# Patient Record
Sex: FEMALE | Race: WHITE | NOT HISPANIC OR LATINO
[De-identification: names, ages, dates, MRNs, and addresses within clinical notes are randomized per-mention and may not be internally consistent; named-entity substitution may affect disease eponyms.]

---

## 2017-05-01 ENCOUNTER — RX ONLY (OUTPATIENT)
Age: 62
Setting detail: RX ONLY
End: 2017-05-01

## 2017-06-15 ENCOUNTER — HOSPITAL ENCOUNTER (OUTPATIENT)
Dept: LAB | Facility: MEDICAL CENTER | Age: 62
End: 2017-06-15
Attending: FAMILY MEDICINE
Payer: COMMERCIAL

## 2017-06-15 LAB
ALBUMIN SERPL BCP-MCNC: 4 G/DL (ref 3.2–4.9)
ALBUMIN/GLOB SERPL: 1.2 G/DL
ALP SERPL-CCNC: 85 U/L (ref 30–99)
ALT SERPL-CCNC: 22 U/L (ref 2–50)
ANION GAP SERPL CALC-SCNC: 7 MMOL/L (ref 0–11.9)
AST SERPL-CCNC: 16 U/L (ref 12–45)
BASOPHILS # BLD AUTO: 0.6 % (ref 0–1.8)
BASOPHILS # BLD: 0.05 K/UL (ref 0–0.12)
BILIRUB SERPL-MCNC: 0.4 MG/DL (ref 0.1–1.5)
BUN SERPL-MCNC: 17 MG/DL (ref 8–22)
CALCIUM SERPL-MCNC: 9.9 MG/DL (ref 8.5–10.5)
CHLORIDE SERPL-SCNC: 101 MMOL/L (ref 96–112)
CHOLEST SERPL-MCNC: 157 MG/DL (ref 100–199)
CO2 SERPL-SCNC: 31 MMOL/L (ref 20–33)
CREAT SERPL-MCNC: 0.66 MG/DL (ref 0.5–1.4)
EOSINOPHIL # BLD AUTO: 0.22 K/UL (ref 0–0.51)
EOSINOPHIL NFR BLD: 2.4 % (ref 0–6.9)
ERYTHROCYTE [DISTWIDTH] IN BLOOD BY AUTOMATED COUNT: 46.3 FL (ref 35.9–50)
GFR SERPL CREATININE-BSD FRML MDRD: >60 ML/MIN/1.73 M 2
GLOBULIN SER CALC-MCNC: 3.3 G/DL (ref 1.9–3.5)
GLUCOSE SERPL-MCNC: 102 MG/DL (ref 65–99)
HCT VFR BLD AUTO: 45.4 % (ref 37–47)
HDLC SERPL-MCNC: 58 MG/DL
HGB BLD-MCNC: 14.5 G/DL (ref 12–16)
IMM GRANULOCYTES # BLD AUTO: 0.03 K/UL (ref 0–0.11)
IMM GRANULOCYTES NFR BLD AUTO: 0.3 % (ref 0–0.9)
LDLC SERPL CALC-MCNC: 80 MG/DL
LYMPHOCYTES # BLD AUTO: 3.2 K/UL (ref 1–4.8)
LYMPHOCYTES NFR BLD: 35.4 % (ref 22–41)
MCH RBC QN AUTO: 29.8 PG (ref 27–33)
MCHC RBC AUTO-ENTMCNC: 31.9 G/DL (ref 33.6–35)
MCV RBC AUTO: 93.2 FL (ref 81.4–97.8)
MONOCYTES # BLD AUTO: 0.77 K/UL (ref 0–0.85)
MONOCYTES NFR BLD AUTO: 8.5 % (ref 0–13.4)
NEUTROPHILS # BLD AUTO: 4.78 K/UL (ref 2–7.15)
NEUTROPHILS NFR BLD: 52.8 % (ref 44–72)
NRBC # BLD AUTO: 0 K/UL
NRBC BLD AUTO-RTO: 0 /100 WBC
PLATELET # BLD AUTO: 477 K/UL (ref 164–446)
PMV BLD AUTO: 9.8 FL (ref 9–12.9)
POTASSIUM SERPL-SCNC: 4.1 MMOL/L (ref 3.6–5.5)
PROT SERPL-MCNC: 7.3 G/DL (ref 6–8.2)
RBC # BLD AUTO: 4.87 M/UL (ref 4.2–5.4)
SODIUM SERPL-SCNC: 139 MMOL/L (ref 135–145)
T4 FREE SERPL-MCNC: 0.76 NG/DL (ref 0.53–1.43)
TRIGL SERPL-MCNC: 94 MG/DL (ref 0–149)
TSH SERPL DL<=0.005 MIU/L-ACNC: 1.62 UIU/ML (ref 0.3–3.7)
WBC # BLD AUTO: 9.1 K/UL (ref 4.8–10.8)

## 2017-06-15 PROCEDURE — 85025 COMPLETE CBC W/AUTO DIFF WBC: CPT

## 2017-06-15 PROCEDURE — 80061 LIPID PANEL: CPT

## 2017-06-15 PROCEDURE — 36415 COLL VENOUS BLD VENIPUNCTURE: CPT

## 2017-06-15 PROCEDURE — 84439 ASSAY OF FREE THYROXINE: CPT

## 2017-06-15 PROCEDURE — 80053 COMPREHEN METABOLIC PANEL: CPT

## 2017-06-15 PROCEDURE — 84443 ASSAY THYROID STIM HORMONE: CPT

## 2017-10-10 ENCOUNTER — OFFICE VISIT (OUTPATIENT)
Dept: MEDICAL GROUP | Facility: PHYSICIAN GROUP | Age: 62
End: 2017-10-10
Payer: COMMERCIAL

## 2017-10-10 VITALS
TEMPERATURE: 96.6 F | RESPIRATION RATE: 18 BRPM | HEART RATE: 74 BPM | BODY MASS INDEX: 45.41 KG/M2 | OXYGEN SATURATION: 92 % | HEIGHT: 64 IN | SYSTOLIC BLOOD PRESSURE: 132 MMHG | WEIGHT: 266 LBS | DIASTOLIC BLOOD PRESSURE: 84 MMHG

## 2017-10-10 DIAGNOSIS — L30.9 DERMATITIS: ICD-10-CM

## 2017-10-10 DIAGNOSIS — Z12.39 SCREENING FOR BREAST CANCER: ICD-10-CM

## 2017-10-10 DIAGNOSIS — E66.01 MORBID OBESITY WITH BMI OF 45.0-49.9, ADULT (HCC): ICD-10-CM

## 2017-10-10 DIAGNOSIS — J45.40 MODERATE PERSISTENT ASTHMA WITHOUT COMPLICATION: ICD-10-CM

## 2017-10-10 DIAGNOSIS — J30.89 CHRONIC NONSEASONAL ALLERGIC RHINITIS DUE TO POLLEN: ICD-10-CM

## 2017-10-10 PROCEDURE — 99214 OFFICE O/P EST MOD 30 MIN: CPT | Performed by: FAMILY MEDICINE

## 2017-10-10 RX ORDER — FLUTICASONE PROPIONATE 110 UG/1
2 AEROSOL, METERED RESPIRATORY (INHALATION) 2 TIMES DAILY
Qty: 1 INHALER | Refills: 3 | Status: SHIPPED | OUTPATIENT
Start: 2017-10-10 | End: 2018-03-27

## 2017-10-10 RX ORDER — LOSARTAN POTASSIUM 100 MG/1
100 TABLET ORAL DAILY
COMMUNITY

## 2017-10-10 RX ORDER — ALBUTEROL SULFATE 90 UG/1
2 AEROSOL, METERED RESPIRATORY (INHALATION) EVERY 6 HOURS PRN
Qty: 8.5 G | Refills: 0 | Status: SHIPPED | OUTPATIENT
Start: 2017-10-10

## 2017-10-10 RX ORDER — TRIAMTERENE AND HYDROCHLOROTHIAZIDE 37.5; 25 MG/1; MG/1
1 CAPSULE ORAL EVERY MORNING
COMMUNITY

## 2017-10-10 RX ORDER — TRIAMCINOLONE ACETONIDE 40 MG/ML
40 INJECTION, SUSPENSION INTRA-ARTICULAR; INTRAMUSCULAR ONCE
Status: COMPLETED | OUTPATIENT
Start: 2017-10-10 | End: 2017-10-10

## 2017-10-10 RX ADMIN — TRIAMCINOLONE ACETONIDE 40 MG: 40 INJECTION, SUSPENSION INTRA-ARTICULAR; INTRAMUSCULAR at 08:53

## 2017-10-10 NOTE — PROGRESS NOTES
Chief Complaint   Patient presents with   • Rash     on legs   • Allergic Reaction       HISTORY OF PRESENT ILLNESS: Patient is a 62 y.o. female established patient here today for the following concerns:    1. Chronic nonseasonal allergic rhinitis due to pollen  Here today with worsening allergies, reports sinus drainage, congestion, rhinorrhea, decreased hearing with congestion.  - Triamcinolone Acetonide    2. Dermatitis  Reports rash on both legs associated with swelling and itching.  No new exposures that she can think of.  Initially cortisone was helping.  Now not helping much.    - Triamcinolone Acetonide    3. Moderate persistent asthma without complication  Here today for renewal on flovent and albuterol.  Reports that she has not had to use rescue much at all recently.     4. Morbid obesity with BMI of 45.0-49.9, adult (CMS-[facility])  Counseled on weight today.  Has tried Paleo diet with some success.     5. Screening for breast cancer  Due for mammo- declines but is willing to do screening breast ultrasound.     Past Medical, Social, and Family history reviewed and updated in EPIC    Allergies:Review of patient's allergies indicates no known allergies.    Current Outpatient Prescriptions   Medication Sig Dispense Refill   • losartan (COZAAR) 100 MG Tab Take 100 mg by mouth every day.     • triamterene/hctz (MAXZIDE-25/DYAZIDE) 37.5-25 MG Cap Take 1 Cap by mouth every morning.     • albuterol 108 (90 Base) MCG/ACT Aero Soln inhalation aerosol Inhale 2 Puffs by mouth every 6 hours as needed for Shortness of Breath. 8.5 g 0   • fluticasone (FLOVENT HFA) 110 MCG/ACT Aerosol Inhale 2 Puffs by mouth 2 times a day. 1 Inhaler 3   • aspirin (ASA) 81 MG Chew Tab chewable tablet Take 81 mg by mouth every day.     • Non Formulary Request Take 450 mg by mouth every day. Indications: Tumeric     • methocarbamol (ROBAXIN) 500 MG TABS Take 1,000 mg by mouth 3 times a day.     • Calcium Carbonate-Vitamin D (CALTRATE 600+D PO)  Take  by mouth every day.     • Milk Thistle 175 MG CAPS Take 175 mg by mouth every day.     • Probiotic Product (PROBIOTIC PO) Take  by mouth every day.     • Oxycodone-Acetaminophen (PERCOCET-10)  MG TABS Take 1-2 Tabs by mouth every four hours as needed.     • morphine (MS IR) 15 MG tablet Take 15 mg by mouth every day at 6 PM.     • polyethylene glycol/lytes (MIRALAX) PACK Take 17 g by mouth every day.     • metoprolol SR (TOPROL XL) 50 MG TB24 TAKE ONE TABLET BY MOUTH EVERY DAY 30 Tab 6   • acetaminophen/caffeine/butalbital 325-40-50 mg (FIORICET) -40 MG TABS Take 1 Tab by mouth every four hours as needed.     • Red Yeast Rice Extract (RED YEAST RICE PO) Take 1,200 mg by mouth.     • topiramate (TOPAMAX) 25 MG TABS Take 25 mg by mouth every day. Takes 1.5 at HS     • FISH OIL        Current Facility-Administered Medications   Medication Dose Route Frequency Provider Last Rate Last Dose   • triamcinolone acetonide (KENALOG-40) injection 40 mg  40 mg Intramuscular Once Magalie MICHELLE Castaneda M.D.             ROS:  Review of Systems   Constitutional: Negative for fever, chills, weight loss and malaise/fatigue.   HENT: Negative for ear pain, nosebleeds, congestion, sore throat and neck pain.    Eyes: Negative for blurred vision.   Respiratory: Negative for cough, sputum production, shortness of breath and wheezing.    Cardiovascular: Negative for chest pain, palpitations,  and leg swelling.   Gastrointestinal: Negative for heartburn, nausea, vomiting, diarrhea and abdominal pain.   Genitourinary: Negative for dysuria, urgency and frequency.   Musculoskeletal: Negative for myalgias, back pain and joint pain.   Skin: Negative for rash and itching.   Neurological: Negative for dizziness, tingling, tremors, sensory change, focal weakness and headaches.   Endo/Heme/Allergies: Does not bruise/bleed easily.   Psychiatric/Behavioral: Negative for depression, anxiety, suicidal ideas, insomnia and memory loss.   "    Exam:  Blood pressure 132/84, pulse 74, temperature 35.9 °C (96.6 °F), resp. rate 18, height 1.626 m (5' 4\"), weight 120.7 kg (266 lb), SpO2 92 %.    General:  Well nourished, well developed in NAD  Head is grossly normal.  Neck: Supple without JVD   Pulmonary:  Normal effort.   Cardiovascular: Regular rate  Extremities: no clubbing, cyanosis, or trace non-pitting edema.  Psych: affect appropriate      Please note that this dictation was created using voice recognition software. I have made every reasonable attempt to correct obvious errors, but I expect that there are errors of grammar and possibly content that I did not discover before finalizing the note.    Assessment/Plan:  1. Chronic nonseasonal allergic rhinitis due to pollen  - triamcinolone acetonide (KENALOG-40) injection 40 mg; 1 mL by Intramuscular route Once.    2. Dermatitis  - triamcinolone acetonide (KENALOG-40) injection 40 mg; 1 mL by Intramuscular route Once.    3. Moderate persistent asthma without complication  - albuterol 108 (90 Base) MCG/ACT Aero Soln inhalation aerosol; Inhale 2 Puffs by mouth every 6 hours as needed for Shortness of Breath.  Dispense: 8.5 g; Refill: 0  - fluticasone (FLOVENT HFA) 110 MCG/ACT Aerosol; Inhale 2 Puffs by mouth 2 times a day.  Dispense: 1 Inhaler; Refill: 3    4. Morbid obesity with BMI of 45.0-49.9, adult (CMS-[facility])  - Patient identified as having weight management issue.  Appropriate orders and counseling given.    5. Screening for breast cancer  - US-SCREENING BREAST (SONOCINE); Future    3 month follow up        "

## 2017-10-21 ENCOUNTER — HOSPITAL ENCOUNTER (OUTPATIENT)
Dept: RADIOLOGY | Facility: MEDICAL CENTER | Age: 62
End: 2017-10-21
Attending: FAMILY MEDICINE
Payer: COMMERCIAL

## 2017-10-21 DIAGNOSIS — R92.30 DENSE BREAST TISSUE: ICD-10-CM

## 2017-10-21 PROCEDURE — 76641 ULTRASOUND BREAST COMPLETE: CPT

## 2017-10-24 ENCOUNTER — TELEPHONE (OUTPATIENT)
Dept: MEDICAL GROUP | Facility: PHYSICIAN GROUP | Age: 62
End: 2017-10-24

## 2017-10-24 NOTE — TELEPHONE ENCOUNTER
Pt states she cant do mammograms because they are painful and that is why she prefers the sonocine .. She also stated her Flovent was too expensive and wanted to know if you can prescribe her something else

## 2017-10-24 NOTE — TELEPHONE ENCOUNTER
----- Message from Magalie Castaneda M.D. sent at 10/24/2017 11:33 AM PDT -----  Breast ultrasound was normal.  They recommend that since breasts are not dense that mammogram is actually more sensitive for early cancers and should be done next year.

## 2018-03-09 ENCOUNTER — OFFICE VISIT (OUTPATIENT)
Dept: URGENT CARE | Facility: PHYSICIAN GROUP | Age: 63
End: 2018-03-09
Payer: COMMERCIAL

## 2018-03-09 VITALS
DIASTOLIC BLOOD PRESSURE: 76 MMHG | WEIGHT: 276 LBS | SYSTOLIC BLOOD PRESSURE: 118 MMHG | BODY MASS INDEX: 47.12 KG/M2 | OXYGEN SATURATION: 93 % | HEART RATE: 73 BPM | RESPIRATION RATE: 20 BRPM | TEMPERATURE: 98.6 F | HEIGHT: 64 IN

## 2018-03-09 DIAGNOSIS — J01.41 ACUTE RECURRENT PANSINUSITIS: Primary | ICD-10-CM

## 2018-03-09 DIAGNOSIS — L30.9 DERMATITIS: ICD-10-CM

## 2018-03-09 PROCEDURE — 99214 OFFICE O/P EST MOD 30 MIN: CPT | Performed by: PHYSICIAN ASSISTANT

## 2018-03-09 RX ORDER — TRIAMCINOLONE ACETONIDE 40 MG/ML
40 INJECTION, SUSPENSION INTRA-ARTICULAR; INTRAMUSCULAR ONCE
Status: COMPLETED | OUTPATIENT
Start: 2018-03-09 | End: 2018-03-09

## 2018-03-09 RX ORDER — CEFDINIR 300 MG/1
300 CAPSULE ORAL 2 TIMES DAILY
Qty: 20 CAP | Refills: 0 | Status: SHIPPED | OUTPATIENT
Start: 2018-03-09 | End: 2018-03-19

## 2018-03-09 RX ADMIN — TRIAMCINOLONE ACETONIDE 40 MG: 40 INJECTION, SUSPENSION INTRA-ARTICULAR; INTRAMUSCULAR at 17:46

## 2018-03-12 ASSESSMENT — ENCOUNTER SYMPTOMS
SINUS PRESSURE: 1
COUGH: 1
HEADACHES: 1
SINUS PAIN: 1
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
## 2018-03-27 ENCOUNTER — OFFICE VISIT (OUTPATIENT)
Dept: MEDICAL GROUP | Facility: PHYSICIAN GROUP | Age: 63
End: 2018-03-27
Payer: COMMERCIAL

## 2018-03-27 VITALS
BODY MASS INDEX: 46.37 KG/M2 | TEMPERATURE: 97.8 F | HEIGHT: 64 IN | DIASTOLIC BLOOD PRESSURE: 82 MMHG | WEIGHT: 271.6 LBS | OXYGEN SATURATION: 97 % | HEART RATE: 67 BPM | SYSTOLIC BLOOD PRESSURE: 132 MMHG | RESPIRATION RATE: 16 BRPM

## 2018-03-27 DIAGNOSIS — R21 RASH: ICD-10-CM

## 2018-03-27 DIAGNOSIS — E66.01 MORBID OBESITY WITH BMI OF 40.0-44.9, ADULT (HCC): ICD-10-CM

## 2018-03-27 DIAGNOSIS — R09.81 CHRONIC NASAL CONGESTION: ICD-10-CM

## 2018-03-27 PROCEDURE — 99213 OFFICE O/P EST LOW 20 MIN: CPT | Performed by: INTERNAL MEDICINE

## 2018-03-27 RX ORDER — PRENATAL VIT 91/IRON/FOLIC/DHA 28-975-200
COMBINATION PACKAGE (EA) ORAL 2 TIMES DAILY
COMMUNITY

## 2018-03-27 RX ORDER — MONTELUKAST SODIUM 10 MG/1
10 TABLET ORAL DAILY
Qty: 60 TAB | Refills: 1 | Status: SHIPPED | OUTPATIENT
Start: 2018-03-27 | End: 2018-07-24 | Stop reason: SDUPTHER

## 2018-03-27 RX ORDER — BENZOCAINE/MENTHOL 6 MG-10 MG
LOZENGE MUCOUS MEMBRANE
Qty: 1 TUBE | Refills: 0 | Status: SHIPPED | OUTPATIENT
Start: 2018-03-27

## 2018-03-27 ASSESSMENT — PATIENT HEALTH QUESTIONNAIRE - PHQ9: CLINICAL INTERPRETATION OF PHQ2 SCORE: 0
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
## 2018-03-27 NOTE — ASSESSMENT & PLAN NOTE
Has had a rash of her right lower extremity since fall 2017 that at first did better with kenalog but not after her most recent kenalog injection 3/9/18.

## 2018-03-27 NOTE — ASSESSMENT & PLAN NOTE
Waking up in the mornings and has green nasal discharge, cough and low on energy also with headaches. She was seen in urgent care 3/9/18 and was given kenalog for these symptoms and a rash on her right leg. Since then she's had no improvement in her symptoms. Denies fevers, chills. Her nasal congestion symptoms improve as the day goes on. She starts to have wheezing later on as the day goes on. She has seen Dr. Talamantes in ENT before who says he wouldn't recommend more than 3 kenalog injections per year. Taking mucinex, xyzal, inhalers. The inhalers help her since sometimes she has difficulty with breathing.

## 2018-05-03 ENCOUNTER — APPOINTMENT (RX ONLY)
Dept: URBAN - METROPOLITAN AREA CLINIC 4 | Facility: CLINIC | Age: 63
Setting detail: DERMATOLOGY
End: 2018-05-03

## 2018-05-03 DIAGNOSIS — L82.1 OTHER SEBORRHEIC KERATOSIS: ICD-10-CM

## 2018-05-03 DIAGNOSIS — D18.0 HEMANGIOMA: ICD-10-CM

## 2018-05-03 DIAGNOSIS — L81.4 OTHER MELANIN HYPERPIGMENTATION: ICD-10-CM

## 2018-05-03 DIAGNOSIS — R21 RASH AND OTHER NONSPECIFIC SKIN ERUPTION: ICD-10-CM

## 2018-05-03 DIAGNOSIS — L57.0 ACTINIC KERATOSIS: ICD-10-CM

## 2018-05-03 DIAGNOSIS — D22 MELANOCYTIC NEVI: ICD-10-CM

## 2018-05-03 PROBLEM — J44.9 CHRONIC OBSTRUCTIVE PULMONARY DISEASE, UNSPECIFIED: Status: ACTIVE | Noted: 2018-05-03

## 2018-05-03 PROBLEM — D18.01 HEMANGIOMA OF SKIN AND SUBCUTANEOUS TISSUE: Status: ACTIVE | Noted: 2018-05-03

## 2018-05-03 PROBLEM — I10 ESSENTIAL (PRIMARY) HYPERTENSION: Status: ACTIVE | Noted: 2018-05-03

## 2018-05-03 PROBLEM — D22.62 MELANOCYTIC NEVI OF LEFT UPPER LIMB, INCLUDING SHOULDER: Status: ACTIVE | Noted: 2018-05-03

## 2018-05-03 PROBLEM — D22.72 MELANOCYTIC NEVI OF LEFT LOWER LIMB, INCLUDING HIP: Status: ACTIVE | Noted: 2018-05-03

## 2018-05-03 PROBLEM — D22.71 MELANOCYTIC NEVI OF RIGHT LOWER LIMB, INCLUDING HIP: Status: ACTIVE | Noted: 2018-05-03

## 2018-05-03 PROBLEM — Z85.828 PERSONAL HISTORY OF OTHER MALIGNANT NEOPLASM OF SKIN: Status: ACTIVE | Noted: 2018-05-03

## 2018-05-03 PROBLEM — D22.61 MELANOCYTIC NEVI OF RIGHT UPPER LIMB, INCLUDING SHOULDER: Status: ACTIVE | Noted: 2018-05-03

## 2018-05-03 PROBLEM — D22.5 MELANOCYTIC NEVI OF TRUNK: Status: ACTIVE | Noted: 2018-05-03

## 2018-05-03 PROCEDURE — 17000 DESTRUCT PREMALG LESION: CPT

## 2018-05-03 PROCEDURE — ? LIQUID NITROGEN

## 2018-05-03 PROCEDURE — ? COUNSELING

## 2018-05-03 PROCEDURE — 17003 DESTRUCT PREMALG LES 2-14: CPT

## 2018-05-03 PROCEDURE — ? SUNSCREEN RECOMMENDATIONS

## 2018-05-03 PROCEDURE — ? PRESCRIPTION

## 2018-05-03 PROCEDURE — 99213 OFFICE O/P EST LOW 20 MIN: CPT | Mod: 25

## 2018-05-03 RX ORDER — TRIAMCINOLONE ACETONIDE 1 MG/G
CREAM TOPICAL
Qty: 1 | Refills: 3 | Status: ERX | COMMUNITY
Start: 2018-05-03

## 2018-05-03 RX ADMIN — TRIAMCINOLONE ACETONIDE: 1 CREAM TOPICAL at 00:00

## 2018-05-03 ASSESSMENT — LOCATION ZONE DERM
LOCATION ZONE: FACE
LOCATION ZONE: HAND
LOCATION ZONE: TRUNK
LOCATION ZONE: NECK
LOCATION ZONE: LEG
LOCATION ZONE: ARM

## 2018-05-03 ASSESSMENT — LOCATION SIMPLE DESCRIPTION DERM
LOCATION SIMPLE: LEFT FOREARM
LOCATION SIMPLE: LEFT CHEEK
LOCATION SIMPLE: RIGHT POSTERIOR UPPER ARM
LOCATION SIMPLE: LEFT POSTERIOR UPPER ARM
LOCATION SIMPLE: RIGHT CHEEK
LOCATION SIMPLE: LEFT UPPER BACK
LOCATION SIMPLE: RIGHT PRETIBIAL REGION
LOCATION SIMPLE: UPPER BACK
LOCATION SIMPLE: RIGHT HAND
LOCATION SIMPLE: LEFT HAND
LOCATION SIMPLE: RIGHT THIGH
LOCATION SIMPLE: LEFT PRETIBIAL REGION
LOCATION SIMPLE: RIGHT LOWER BACK
LOCATION SIMPLE: LEFT THIGH
LOCATION SIMPLE: LEFT ANTERIOR NECK
LOCATION SIMPLE: RIGHT FOREARM
LOCATION SIMPLE: ABDOMEN

## 2018-05-03 ASSESSMENT — LOCATION DETAILED DESCRIPTION DERM
LOCATION DETAILED: RIGHT DISTAL PRETIBIAL REGION
LOCATION DETAILED: LEFT CENTRAL MALAR CHEEK
LOCATION DETAILED: LEFT ULNAR DORSAL HAND
LOCATION DETAILED: LEFT SUPERIOR MEDIAL UPPER BACK
LOCATION DETAILED: LEFT INFERIOR CENTRAL MALAR CHEEK
LOCATION DETAILED: RIGHT SUPERIOR MEDIAL MIDBACK
LOCATION DETAILED: RIGHT ANTERIOR PROXIMAL THIGH
LOCATION DETAILED: LEFT SUPERIOR LATERAL MALAR CHEEK
LOCATION DETAILED: RIGHT RIB CAGE
LOCATION DETAILED: LEFT ANTERIOR PROXIMAL THIGH
LOCATION DETAILED: LEFT DISTAL PRETIBIAL REGION
LOCATION DETAILED: RIGHT DISTAL POSTERIOR UPPER ARM
LOCATION DETAILED: RIGHT CENTRAL MALAR CHEEK
LOCATION DETAILED: RIGHT PROXIMAL DORSAL FOREARM
LOCATION DETAILED: RIGHT RADIAL DORSAL HAND
LOCATION DETAILED: LEFT INFERIOR ANTERIOR NECK
LOCATION DETAILED: LEFT PROXIMAL POSTERIOR UPPER ARM
LOCATION DETAILED: PERIUMBILICAL SKIN
LOCATION DETAILED: SUPERIOR THORACIC SPINE
LOCATION DETAILED: LEFT PROXIMAL DORSAL FOREARM

## 2018-05-03 NOTE — PROCEDURE: LIQUID NITROGEN
Duration Of Freeze Thaw-Cycle (Seconds): 3
Post-Care Instructions: I reviewed with the patient in detail post-care instructions. Patient is to wear sunprotection, and avoid picking at any of the treated lesions. Pt may apply Vaseline to crusted or scabbing areas.
Render Post-Care Instructions In Note?: no
Number Of Freeze-Thaw Cycles: 2 freeze-thaw cycles
Consent: The patient's consent was obtained including but not limited to risks of crusting, scabbing, blistering, scarring, darker or lighter pigmentary change, recurrence, incomplete removal and infection.
Detail Level: Simple

## 2018-05-03 NOTE — HPI: FULL BODY SKIN EXAMINATION
How Severe Are Your Spot(S)?: moderate
What Is The Reason For Today's Visit?: Full Body Skin Examination
What Is The Reason For Today's Visit? (Being Monitored For X): the risk of recurrence of previously treated lesion(s)
Additional History: Intermittent rash on lower right leg for 2 years. FBE.

## 2018-05-21 ENCOUNTER — HOSPITAL ENCOUNTER (OUTPATIENT)
Dept: RADIOLOGY | Facility: MEDICAL CENTER | Age: 63
End: 2018-05-21
Attending: ALLERGY & IMMUNOLOGY
Payer: COMMERCIAL

## 2018-05-21 DIAGNOSIS — R05.9 COUGH: ICD-10-CM

## 2018-05-21 PROCEDURE — 71046 X-RAY EXAM CHEST 2 VIEWS: CPT

## 2018-07-08 ENCOUNTER — HOSPITAL ENCOUNTER (OUTPATIENT)
Dept: RADIOLOGY | Facility: MEDICAL CENTER | Age: 63
End: 2018-07-08
Attending: EMERGENCY MEDICINE
Payer: COMMERCIAL

## 2018-07-08 ENCOUNTER — OFFICE VISIT (OUTPATIENT)
Dept: URGENT CARE | Facility: PHYSICIAN GROUP | Age: 63
End: 2018-07-08
Payer: COMMERCIAL

## 2018-07-08 VITALS
TEMPERATURE: 99 F | OXYGEN SATURATION: 95 % | BODY MASS INDEX: 46.17 KG/M2 | WEIGHT: 269 LBS | HEART RATE: 61 BPM | RESPIRATION RATE: 16 BRPM | SYSTOLIC BLOOD PRESSURE: 128 MMHG | DIASTOLIC BLOOD PRESSURE: 76 MMHG

## 2018-07-08 DIAGNOSIS — M25.561 ACUTE PAIN OF RIGHT KNEE: ICD-10-CM

## 2018-07-08 PROCEDURE — 73562 X-RAY EXAM OF KNEE 3: CPT | Mod: RT

## 2018-07-08 PROCEDURE — 99214 OFFICE O/P EST MOD 30 MIN: CPT | Performed by: EMERGENCY MEDICINE

## 2018-07-08 ASSESSMENT — ENCOUNTER SYMPTOMS
EYE REDNESS: 0
SPEECH CHANGE: 0
SENSORY CHANGE: 0
FEVER: 0
EYE DISCHARGE: 0
VOMITING: 0
FALLS: 0
CHILLS: 0
NAUSEA: 0
NERVOUS/ANXIOUS: 0

## 2018-07-09 ASSESSMENT — ENCOUNTER SYMPTOMS
ABDOMINAL PAIN: 0
COUGH: 0
PALPITATIONS: 0
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
## 2018-07-24 ENCOUNTER — OFFICE VISIT (OUTPATIENT)
Dept: MEDICAL GROUP | Facility: PHYSICIAN GROUP | Age: 63
End: 2018-07-24
Payer: COMMERCIAL

## 2018-07-24 VITALS
RESPIRATION RATE: 16 BRPM | OXYGEN SATURATION: 94 % | DIASTOLIC BLOOD PRESSURE: 78 MMHG | TEMPERATURE: 97.3 F | SYSTOLIC BLOOD PRESSURE: 138 MMHG | WEIGHT: 265 LBS | BODY MASS INDEX: 45.24 KG/M2 | HEART RATE: 85 BPM | HEIGHT: 64 IN

## 2018-07-24 DIAGNOSIS — J45.909 UNCOMPLICATED ASTHMA, UNSPECIFIED ASTHMA SEVERITY, UNSPECIFIED WHETHER PERSISTENT: ICD-10-CM

## 2018-07-24 DIAGNOSIS — R09.81 CHRONIC NASAL CONGESTION: ICD-10-CM

## 2018-07-24 DIAGNOSIS — M25.561 ACUTE PAIN OF RIGHT KNEE: ICD-10-CM

## 2018-07-24 DIAGNOSIS — B37.9 YEAST INFECTION: ICD-10-CM

## 2018-07-24 PROCEDURE — 99213 OFFICE O/P EST LOW 20 MIN: CPT | Performed by: INTERNAL MEDICINE

## 2018-07-24 RX ORDER — FLUTICASONE PROPIONATE 110 UG/1
2 AEROSOL, METERED RESPIRATORY (INHALATION) 2 TIMES DAILY
Qty: 1 INHALER | Refills: 1 | Status: SHIPPED | OUTPATIENT
Start: 2018-07-24

## 2018-07-24 RX ORDER — CLOTRIMAZOLE 1 %
CREAM (GRAM) TOPICAL
Qty: 1 TUBE | Refills: 0 | Status: SHIPPED | OUTPATIENT
Start: 2018-07-24

## 2018-07-24 RX ORDER — MONTELUKAST SODIUM 10 MG/1
10 TABLET ORAL DAILY
Qty: 60 TAB | Refills: 1 | Status: SHIPPED | OUTPATIENT
Start: 2018-07-24 | End: 2018-11-21 | Stop reason: SDUPTHER

## 2018-07-24 RX ORDER — MORPHINE SULFATE 15 MG/1
TABLET, FILM COATED, EXTENDED RELEASE ORAL
COMMUNITY
Start: 2018-07-05

## 2018-07-24 RX ORDER — ALBUTEROL SULFATE 90 UG/1
2 AEROSOL, METERED RESPIRATORY (INHALATION) EVERY 6 HOURS PRN
Qty: 8.5 G | Refills: 1 | Status: SHIPPED | OUTPATIENT
Start: 2018-07-24

## 2018-07-24 RX ORDER — TRIAMCINOLONE ACETONIDE 1 MG/G
CREAM TOPICAL
COMMUNITY
Start: 2018-05-03

## 2018-07-25 NOTE — ASSESSMENT & PLAN NOTE
Felt a pop in her right knee on 7/3/2018 in the late afternoon and she was pushing something across the backseat of her car. The sudden pop hurt her really badly. Symptoms didn't improve and she used a cane to ambulate with and came to urgent care 7/8/2018 with an x-ray showing just arthritis. She was given crutches. Can bear only very little weight on her leg. She has to sit in a shower chair since she can't stand up. Without her brace and getting in and out of the shower she keeps a crutch or a cane close by and feels a lot of instability. Inward movement of her right foot causes her knee pain to be much worse.

## 2018-07-25 NOTE — PROGRESS NOTES
PRIMARY CARE CLINIC FOLLOW UP VISIT  Chief Complaint   Patient presents with   • Knee Pain     Rt Side    • Nasal Congestion     Singulair Refill    • Asthma     Inhaler Refill    • Yeast Infection     Abdomen Area     History of Present Illness     Dorota is a pleasant 64 yo female patient of Dr. Castaneda presenting for the following:     Acute pain of right knee  Felt a pop in her right knee on 7/3/2018 in the late afternoon and she was pushing something across the backseat of her car. The sudden pop hurt her really badly. Symptoms didn't improve and she used a cane to ambulate with and came to urgent care 7/8/2018 with an x-ray showing just arthritis. She was given crutches. Can bear only very little weight on her leg. She has to sit in a shower chair since she can't stand up. Without her brace and getting in and out of the shower she keeps a crutch or a cane close by and feels a lot of instability. Inward movement of her right foot causes her knee pain to be much worse.     Yeast infection  She has been having a sensation of fire under her pannus for a long while and it is malodorous.     Current Outpatient Prescriptions   Medication Sig Dispense Refill   • morphine ER (MS CONTIN) 15 MG Tab CR tablet      • triamcinolone acetonide (KENALOG) 0.1 % Cream      • montelukast (SINGULAIR) 10 MG Tab Take 1 Tab by mouth every day. 60 Tab 1   • fluticasone (FLOVENT HFA) 110 MCG/ACT Aerosol Inhale 2 Puffs by mouth 2 times a day. 1 Inhaler 1   • albuterol 108 (90 Base) MCG/ACT Aero Soln inhalation aerosol Inhale 2 Puffs by mouth every 6 hours as needed for Shortness of Breath. 8.5 g 1   • clotrimazole (LOTRIMIN) 1 % Cream Apply to affected areas daily 1 Tube 0   • terbinafine (LAMISIL) 1 % cream Apply  to affected area(s) 2 times a day.     • hydrocortisone 1 % Cream Apply daily to right leg rash 1 Tube 0   • mometasone (ASMANEX) 220 MCG/INH inhaler Inhale 2 Puffs by mouth every day. 1 Inhaler 5   • losartan (COZAAR) 100 MG  "Tab Take 100 mg by mouth every day.     • triamterene/hctz (MAXZIDE-25/DYAZIDE) 37.5-25 MG Cap Take 1 Cap by mouth every morning.     • albuterol 108 (90 Base) MCG/ACT Aero Soln inhalation aerosol Inhale 2 Puffs by mouth every 6 hours as needed for Shortness of Breath. 8.5 g 0   • aspirin (ASA) 81 MG Chew Tab chewable tablet Take 81 mg by mouth every day.     • Non Formulary Request Take 450 mg by mouth every day. Indications: Tumeric     • methocarbamol (ROBAXIN) 500 MG TABS Take 1,000 mg by mouth 3 times a day.     • Calcium Carbonate-Vitamin D (CALTRATE 600+D PO) Take  by mouth every day.     • Milk Thistle 175 MG CAPS Take 175 mg by mouth every day.     • Probiotic Product (PROBIOTIC PO) Take  by mouth every day.     • Oxycodone-Acetaminophen (PERCOCET-10)  MG TABS Take 1-2 Tabs by mouth every four hours as needed.     • morphine (MS IR) 15 MG tablet Take 15 mg by mouth every day at 6 PM.     • polyethylene glycol/lytes (MIRALAX) PACK Take 17 g by mouth every day.     • metoprolol SR (TOPROL XL) 50 MG TB24 TAKE ONE TABLET BY MOUTH EVERY DAY 30 Tab 6   • acetaminophen/caffeine/butalbital 325-40-50 mg (FIORICET) -40 MG TABS Take 1 Tab by mouth every four hours as needed.     • Red Yeast Rice Extract (RED YEAST RICE PO) Take 1,200 mg by mouth.     • topiramate (TOPAMAX) 25 MG TABS Take 25 mg by mouth every day. Takes 1.5 at HS     • FISH OIL        No current facility-administered medications for this visit.      ROS  As per HPI above. All other systems reviewed and negative.        Objective   Blood pressure 138/78, pulse 85, temperature 36.3 °C (97.3 °F), resp. rate 16, height 1.626 m (5' 4\"), weight 120.2 kg (265 lb), SpO2 94 %. Body mass index is 45.49 kg/m².    General: alert and oriented, pleasant, cooperative  Lymphatics: no cervical or supraclavicular lymphadenopathy   Skin: erythema underneath pannus and into inguinal regions   MSK: medial right knee effusion   Psychiatric: appropriate mood and " affect. Good insight and appropriate judgment     Assessment and Plan   The following treatment plan was discussed     1. Acute pain of right knee  Her mechanism of injury seems consistent with a MCL tear/involvement, will evaluate with MRI and will likely need management with orthopaedics.   - MR-KNEE-W/O RIGHT; Future    2. Chronic nasal congestion  - montelukast (SINGULAIR) 10 MG Tab; Take 1 Tab by mouth every day.  Dispense: 60 Tab; Refill: 1    3. Uncomplicated asthma, unspecified asthma severity, unspecified whether persistent  - fluticasone (FLOVENT HFA) 110 MCG/ACT Aerosol; Inhale 2 Puffs by mouth 2 times a day.  Dispense: 1 Inhaler; Refill: 1  - albuterol 108 (90 Base) MCG/ACT Aero Soln inhalation aerosol; Inhale 2 Puffs by mouth every 6 hours as needed for Shortness of Breath.  Dispense: 8.5 g; Refill: 1    4. Yeast infection  Advised her to keep the area clean and dry and prescribed clotrimazole.   - clotrimazole (LOTRIMIN) 1 % Cream; Apply to affected areas daily  Dispense: 1 Tube; Refill: 0    Return if symptoms worsen or fail to improve.    Dm More MD  Internal Medicine  [facility]

## 2018-07-26 ENCOUNTER — HOSPITAL ENCOUNTER (OUTPATIENT)
Dept: LAB | Facility: MEDICAL CENTER | Age: 63
End: 2018-07-26
Attending: INTERNAL MEDICINE
Payer: COMMERCIAL

## 2018-07-26 LAB
ALBUMIN SERPL BCP-MCNC: 4.3 G/DL (ref 3.2–4.9)
ALBUMIN/GLOB SERPL: 1.4 G/DL
ALP SERPL-CCNC: 78 U/L (ref 30–99)
ALT SERPL-CCNC: 31 U/L (ref 2–50)
ANION GAP SERPL CALC-SCNC: 8 MMOL/L (ref 0–11.9)
AST SERPL-CCNC: 24 U/L (ref 12–45)
BASOPHILS # BLD AUTO: 0.7 % (ref 0–1.8)
BASOPHILS # BLD: 0.05 K/UL (ref 0–0.12)
BILIRUB SERPL-MCNC: 0.4 MG/DL (ref 0.1–1.5)
BUN SERPL-MCNC: 13 MG/DL (ref 8–22)
CALCIUM SERPL-MCNC: 10.1 MG/DL (ref 8.5–10.5)
CHLORIDE SERPL-SCNC: 101 MMOL/L (ref 96–112)
CHOLEST SERPL-MCNC: 147 MG/DL (ref 100–199)
CO2 SERPL-SCNC: 28 MMOL/L (ref 20–33)
CREAT SERPL-MCNC: 0.66 MG/DL (ref 0.5–1.4)
EOSINOPHIL # BLD AUTO: 0.22 K/UL (ref 0–0.51)
EOSINOPHIL NFR BLD: 3.1 % (ref 0–6.9)
ERYTHROCYTE [DISTWIDTH] IN BLOOD BY AUTOMATED COUNT: 50 FL (ref 35.9–50)
GLOBULIN SER CALC-MCNC: 3 G/DL (ref 1.9–3.5)
GLUCOSE SERPL-MCNC: 97 MG/DL (ref 65–99)
HCT VFR BLD AUTO: 45.8 % (ref 37–47)
HDLC SERPL-MCNC: 60 MG/DL
HGB BLD-MCNC: 14.7 G/DL (ref 12–16)
IMM GRANULOCYTES # BLD AUTO: 0.01 K/UL (ref 0–0.11)
IMM GRANULOCYTES NFR BLD AUTO: 0.1 % (ref 0–0.9)
LDLC SERPL CALC-MCNC: 64 MG/DL
LYMPHOCYTES # BLD AUTO: 2.93 K/UL (ref 1–4.8)
LYMPHOCYTES NFR BLD: 40.9 % (ref 22–41)
MCH RBC QN AUTO: 30.5 PG (ref 27–33)
MCHC RBC AUTO-ENTMCNC: 32.1 G/DL (ref 33.6–35)
MCV RBC AUTO: 95 FL (ref 81.4–97.8)
MONOCYTES # BLD AUTO: 0.62 K/UL (ref 0–0.85)
MONOCYTES NFR BLD AUTO: 8.6 % (ref 0–13.4)
NEUTROPHILS # BLD AUTO: 3.34 K/UL (ref 2–7.15)
NEUTROPHILS NFR BLD: 46.6 % (ref 44–72)
NRBC # BLD AUTO: 0 K/UL
NRBC BLD-RTO: 0 /100 WBC
PLATELET # BLD AUTO: 472 K/UL (ref 164–446)
PMV BLD AUTO: 9.8 FL (ref 9–12.9)
POTASSIUM SERPL-SCNC: 3.8 MMOL/L (ref 3.6–5.5)
PROT SERPL-MCNC: 7.3 G/DL (ref 6–8.2)
RBC # BLD AUTO: 4.82 M/UL (ref 4.2–5.4)
SODIUM SERPL-SCNC: 137 MMOL/L (ref 135–145)
T4 FREE SERPL-MCNC: 0.76 NG/DL (ref 0.53–1.43)
TRIGL SERPL-MCNC: 115 MG/DL (ref 0–149)
WBC # BLD AUTO: 7.2 K/UL (ref 4.8–10.8)

## 2018-07-26 PROCEDURE — 84439 ASSAY OF FREE THYROXINE: CPT

## 2018-07-26 PROCEDURE — 36415 COLL VENOUS BLD VENIPUNCTURE: CPT

## 2018-07-26 PROCEDURE — 80053 COMPREHEN METABOLIC PANEL: CPT

## 2018-07-26 PROCEDURE — 80061 LIPID PANEL: CPT

## 2018-07-26 PROCEDURE — 85025 COMPLETE CBC W/AUTO DIFF WBC: CPT

## 2018-08-06 DIAGNOSIS — R09.81 CHRONIC NASAL CONGESTION: ICD-10-CM

## 2018-08-06 DIAGNOSIS — M25.561 ACUTE PAIN OF RIGHT KNEE: ICD-10-CM

## 2018-08-06 NOTE — TELEPHONE ENCOUNTER
Was the patient seen in the last year in this department? Yes    Does patient have an active prescription for medications requested? No     Received Request Via: Patient     Insurance does not cover the Proventil can you please order this inhaler for the patient. Thank you

## 2018-08-07 ENCOUNTER — APPOINTMENT (OUTPATIENT)
Dept: RADIOLOGY | Facility: MEDICAL CENTER | Age: 63
End: 2018-08-07
Attending: INTERNAL MEDICINE
Payer: COMMERCIAL

## 2018-08-07 NOTE — TELEPHONE ENCOUNTER
----- Message from Jenny Borja sent at 8/2/2018  8:31 AM PDT -----  Regarding: Needing a P2P for the MRI of the Knee  Good morning Dr. More,    I just spoke with [employer] whom is the third party to [facility]on for their authorization. The RN reviewer stated a peer to peer would be helpful in getting this test approved. She was not able to approve it due to she did not see anything related to ortho or any physical therapy. Asked if you would call and do a P2P to give more information at this time. To do the P2P you will need to call [employer] at: [phone], tracking number: [id number].     If you have any questions you can reach me by in-basket or my ext. [phone].     Once the P2P is done if you can let me know that would be wonderful, thank you!!    Thank you for all that you do,  Jenny :)

## 2018-08-07 NOTE — TELEPHONE ENCOUNTER
Inhaler Rx sent.     Sorry, I haven't been able to do the P2P and wasn't able to get a hold of them. Did place a referral to sports medicine in the meantime though and will try the P2P again tomorrow.

## 2018-08-07 NOTE — TELEPHONE ENCOUNTER
P2P was denied. They are requesting further documentation from us and possible physical therapy before considering approval of an MRI.     Can we have her put on my schedule for today or tomorrow for further evaluation?

## 2018-08-08 NOTE — TELEPHONE ENCOUNTER
Rachelle confusing to have two conversations in one message, Pt is also very confused for all the phone calls going on and different conversations.   I notified Pt in regards to her medication being changed, states understanding.  Also notified Pt in regards to keeping appointment next week with Kenny 8/13/18. Also scheduled her with Leonel, states her swelling and knee pain is still bothersome and hoping a further eval could get a peer to peer accepted for the MRI.

## 2018-08-08 NOTE — TELEPHONE ENCOUNTER
Well, had wanted her to come in for further evaluation for her knee since her MRI was denied. However, I see that she is going to see Dr. Cox in sports medicine next week 8/13/2018 and she may certainly keep that appointment so he can try to help her out with the knee.

## 2018-08-09 ENCOUNTER — OFFICE VISIT (OUTPATIENT)
Dept: MEDICAL GROUP | Facility: PHYSICIAN GROUP | Age: 63
End: 2018-08-09
Payer: COMMERCIAL

## 2018-08-09 VITALS
WEIGHT: 262.8 LBS | HEART RATE: 58 BPM | SYSTOLIC BLOOD PRESSURE: 136 MMHG | RESPIRATION RATE: 18 BRPM | DIASTOLIC BLOOD PRESSURE: 82 MMHG | HEIGHT: 64 IN | OXYGEN SATURATION: 96 % | BODY MASS INDEX: 44.86 KG/M2 | TEMPERATURE: 96.8 F

## 2018-08-09 DIAGNOSIS — M25.561 CHRONIC PAIN OF RIGHT KNEE: ICD-10-CM

## 2018-08-09 DIAGNOSIS — M25.461 EFFUSION OF RIGHT KNEE: ICD-10-CM

## 2018-08-09 DIAGNOSIS — M17.11 PRIMARY OSTEOARTHRITIS OF RIGHT KNEE: ICD-10-CM

## 2018-08-09 DIAGNOSIS — L30.9 DERMATITIS: ICD-10-CM

## 2018-08-09 DIAGNOSIS — G89.29 CHRONIC PAIN OF RIGHT KNEE: ICD-10-CM

## 2018-08-09 PROCEDURE — 99214 OFFICE O/P EST MOD 30 MIN: CPT | Performed by: FAMILY MEDICINE

## 2018-08-09 NOTE — PROGRESS NOTES
Chief Complaint   Patient presents with   • Knee Pain     rt knee pain   • Other     spont on lft leg x 3 days       HISTORY OF PRESENT ILLNESS: Patient is a 63 y.o. female established patient here today for the following concerns:    1. Dermatitis  Dorota is here with concern over skin rash on the left anterior shin that is pruritic in nature.  Was given topical antifungal with minimal improvement.  Derm gave her triamcinolone which has worsened it.  She reports it is back and causing a lot of distress.      2. Chronic pain of right knee  3. Primary osteoarthritis of right knee  4. Effusion of right knee    Patient has hx of chronic knee pain with acute injury over 1 month ago in which she was pushing an object across her car seat and felt a large pop with immediate pain and swelling in the medial and posterior aspect of the knee.  She was seen in UC and underwent xray which demonstrated significant tricompartment arthritis.  She was re-evaluated a couple weeks later with no improvement despite RICE therapy.  She was felt possible meniscal tear and MRI was ordered but declined by insurance.  A referral to sports medicine was placed and is scheduled next week.  She reports aching pain with any ambulation, continued swelling, worse in the back of the knee and anterior knee with medial pain that radiates up the medial thigh.  Some giving way and locking noted.        Past Medical, Social, and Family history reviewed and updated in EPIC    Allergies:Patient has no known allergies.    Current Outpatient Prescriptions   Medication Sig Dispense Refill   • mupirocin (BACTROBAN) 2 % Ointment Apply 1 Application to affected area(s) 2 times a day. 1 Tube 1   • mometasone (ASMANEX) 220 MCG/INH inhaler Inhale 2 Puffs by mouth every day. 1 Inhaler 3   • morphine ER (MS CONTIN) 15 MG Tab CR tablet      • triamcinolone acetonide (KENALOG) 0.1 % Cream      • montelukast (SINGULAIR) 10 MG Tab Take 1 Tab by mouth every day. 60 Tab 1    • clotrimazole (LOTRIMIN) 1 % Cream Apply to affected areas daily 1 Tube 0   • terbinafine (LAMISIL) 1 % cream Apply  to affected area(s) 2 times a day.     • losartan (COZAAR) 100 MG Tab Take 100 mg by mouth every day.     • triamterene/hctz (MAXZIDE-25/DYAZIDE) 37.5-25 MG Cap Take 1 Cap by mouth every morning.     • albuterol 108 (90 Base) MCG/ACT Aero Soln inhalation aerosol Inhale 2 Puffs by mouth every 6 hours as needed for Shortness of Breath. 8.5 g 0   • aspirin (ASA) 81 MG Chew Tab chewable tablet Take 81 mg by mouth every day.     • Non Formulary Request Take 450 mg by mouth every day. Indications: Tumeric     • methocarbamol (ROBAXIN) 500 MG TABS Take 1,000 mg by mouth 3 times a day.     • Calcium Carbonate-Vitamin D (CALTRATE 600+D PO) Take  by mouth every day.     • Milk Thistle 175 MG CAPS Take 175 mg by mouth every day.     • Probiotic Product (PROBIOTIC PO) Take  by mouth every day.     • Oxycodone-Acetaminophen (PERCOCET-10)  MG TABS Take 1-2 Tabs by mouth every four hours as needed.     • morphine (MS IR) 15 MG tablet Take 15 mg by mouth every day at 6 PM.     • polyethylene glycol/lytes (MIRALAX) PACK Take 17 g by mouth every day.     • metoprolol SR (TOPROL XL) 50 MG TB24 TAKE ONE TABLET BY MOUTH EVERY DAY 30 Tab 6   • acetaminophen/caffeine/butalbital 325-40-50 mg (FIORICET) -40 MG TABS Take 1 Tab by mouth every four hours as needed.     • Red Yeast Rice Extract (RED YEAST RICE PO) Take 1,200 mg by mouth.     • topiramate (TOPAMAX) 25 MG TABS Take 25 mg by mouth every day. Takes 1.5 at HS     • FISH OIL      • fluticasone (FLOVENT HFA) 110 MCG/ACT Aerosol Inhale 2 Puffs by mouth 2 times a day. 1 Inhaler 1   • albuterol 108 (90 Base) MCG/ACT Aero Soln inhalation aerosol Inhale 2 Puffs by mouth every 6 hours as needed for Shortness of Breath. 8.5 g 1   • hydrocortisone 1 % Cream Apply daily to right leg rash 1 Tube 0     No current facility-administered medications for this visit.   "        ROS:  Review of Systems   Constitutional: Negative for fever, chills, weight loss and malaise/fatigue.   HENT: Negative for ear pain, nosebleeds, congestion, sore throat and neck pain.    Eyes: Negative for blurred vision.   Respiratory: Negative for cough, sputum production, shortness of breath and wheezing.    Cardiovascular: Negative for chest pain, palpitations,  and leg swelling.   Gastrointestinal: Negative for heartburn, nausea, vomiting, diarrhea and abdominal pain.   Genitourinary: Negative for dysuria, urgency and frequency.   Musculoskeletal: Negative for myalgias, back pain and joint pain.   Skin: Negative for rash and itching.   Neurological: Negative for dizziness, tingling, tremors, sensory change, focal weakness and headaches.   Endo/Heme/Allergies: Does not bruise/bleed easily.   Psychiatric/Behavioral: Negative for depression, anxiety, suicidal ideas, insomnia and memory loss.      Exam:  Blood pressure 136/82, pulse (!) 58, temperature 36 °C (96.8 °F), resp. rate 18, height 1.626 m (5' 4\"), weight 119.2 kg (262 lb 12.8 oz), SpO2 96 %.    General:  Well nourished, well developed in NAD  Head is grossly normal.  Neck: Supple without JVD   Pulmonary:  Normal effort.   Cardiovascular: Regular rate  Extremities: no clubbing, cyanosis, or edema.  Psych: affect appropriate  MSK: effusion noted on exam.  Decreased flexion.  Unable to perform lochman testing due to body habitus.  Effusion precludes laxity testing.  tendernes in the posterior fossa with pedal edema or palpable cords.      Please note that this dictation was created using voice recognition software. I have made every reasonable attempt to correct obvious errors, but I expect that there are errors of grammar and possibly content that I did not discover before finalizing the note.    Assessment/Plan:  1. Dermatitis  Trial of  - mupirocin (BACTROBAN) 2 % Ointment; Apply 1 Application to affected area(s) 2 times a day.  Dispense: 1 Tube; " Refill: 1    2. Chronic pain of right knee  - MR-KNEE-W/O RIGHT; Future  - REFERRAL TO PHYSICAL THERAPY Reason for Therapy: Eval/Treat/Report    3. Primary osteoarthritis of right knee  - REFERRAL TO PHYSICAL THERAPY Reason for Therapy: Eval/Treat/Report    4. Effusion of right knee  - REFERRAL TO PHYSICAL THERAPY Reason for Therapy: Eval/Treat/Report

## 2018-08-13 ENCOUNTER — OFFICE VISIT (OUTPATIENT)
Dept: MEDICAL GROUP | Facility: CLINIC | Age: 63
End: 2018-08-13
Payer: COMMERCIAL

## 2018-08-13 VITALS
RESPIRATION RATE: 18 BRPM | DIASTOLIC BLOOD PRESSURE: 82 MMHG | HEIGHT: 64 IN | TEMPERATURE: 98.4 F | BODY MASS INDEX: 44.86 KG/M2 | WEIGHT: 262.75 LBS | HEART RATE: 74 BPM | SYSTOLIC BLOOD PRESSURE: 128 MMHG | OXYGEN SATURATION: 96 %

## 2018-08-13 DIAGNOSIS — M17.11 PRIMARY OSTEOARTHRITIS OF RIGHT KNEE: ICD-10-CM

## 2018-08-13 PROCEDURE — 20610 DRAIN/INJ JOINT/BURSA W/O US: CPT | Mod: RT | Performed by: FAMILY MEDICINE

## 2018-08-13 PROCEDURE — 99203 OFFICE O/P NEW LOW 30 MIN: CPT | Mod: 25 | Performed by: FAMILY MEDICINE

## 2018-08-13 RX ORDER — TRIAMCINOLONE ACETONIDE 40 MG/ML
40 INJECTION, SUSPENSION INTRA-ARTICULAR; INTRAMUSCULAR ONCE
Status: COMPLETED | OUTPATIENT
Start: 2018-08-13 | End: 2018-08-13

## 2018-08-13 RX ADMIN — TRIAMCINOLONE ACETONIDE 40 MG: 40 INJECTION, SUSPENSION INTRA-ARTICULAR; INTRAMUSCULAR at 14:07
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
## 2018-08-21 ENCOUNTER — TELEPHONE (OUTPATIENT)
Dept: MEDICAL GROUP | Facility: PHYSICIAN GROUP | Age: 63
End: 2018-08-21

## 2018-08-21 NOTE — TELEPHONE ENCOUNTER
Patient needs to try physical therapy before her MRI.  Referral put in for the referral to physical therapy.  Left a message for the patient to call back regarding MRI.

## 2018-08-21 NOTE — TELEPHONE ENCOUNTER
Peer to peer completed.  Insurance is declining the MRI of the knee.  They would like her to complete physical therapy before approving the MRI.

## 2018-08-21 NOTE — TELEPHONE ENCOUNTER
----- Message from Magalie Castaneda M.D. sent at 8/21/2018 12:15 PM PDT -----  Regarding: FW: P2P [employer] Authorization Pending additional clinicals      ----- Message -----  From: Sharmaine Calhoun  Sent: 8/21/2018  11:57 AM  To: Magalie Castaneda M.D.  Subject: RE: P[employer]P [employer] Authorization Pending additional#      Ok, thank you very much, I will cancel the appt, pls contact the pt and advise of treatment option for PT.     Thank you.       ----- Message -----  From: Magalie Castaneda M.D.  Sent: 8/21/2018  11:52 AM  To: Sharmaine Calhoun  Subject: RE: P[employer]P [employer] Authorization Pending additional#    Declined by insurance.  Patient needs to complete PT first.   ----- Message -----  From: Sharmaine Calhoun  Sent: 8/20/2018   2:54 PM  To: Magalie Castaneda M.D.  Subject: P2P [employer] Authorization Pending additional cli#    Hello,       Please contact [facility] for a P2P regarding reference #[id number] at [phone]. Please follow the prompts for P2P, and provide additional clinicals needed for authorization approval. Pt is scheduled 08/22/18. Please update as soon as P2P done of status.     Thank you.   Sharmaine, Utilization Management

## 2018-08-21 NOTE — TELEPHONE ENCOUNTER
Imaging department called and is asking for a peer to peer with Dr Castaneda in order for the MRI to be done. Spoke with Dr Castaneda who is unable to do this at the time needed by 1pm today. I left a vm with the imaging auth number in.

## 2018-08-22 ENCOUNTER — APPOINTMENT (OUTPATIENT)
Dept: RADIOLOGY | Facility: MEDICAL CENTER | Age: 63
End: 2018-08-22
Attending: FAMILY MEDICINE
Payer: COMMERCIAL

## 2018-09-07 ENCOUNTER — PHYSICAL THERAPY (OUTPATIENT)
Dept: PHYSICAL THERAPY | Facility: REHABILITATION | Age: 63
End: 2018-09-07
Attending: FAMILY MEDICINE
Payer: COMMERCIAL

## 2018-09-07 DIAGNOSIS — M17.11 PRIMARY OSTEOARTHRITIS OF RIGHT KNEE: ICD-10-CM

## 2018-09-07 PROCEDURE — 97110 THERAPEUTIC EXERCISES: CPT

## 2018-09-07 PROCEDURE — 97161 PT EVAL LOW COMPLEX 20 MIN: CPT

## 2018-09-07 ASSESSMENT — ENCOUNTER SYMPTOMS
PAIN SCALE AT HIGHEST: 8
PAIN SCALE: 6
PAIN SCALE AT LOWEST: 6
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
## 2018-09-11 ENCOUNTER — PHYSICAL THERAPY (OUTPATIENT)
Dept: PHYSICAL THERAPY | Facility: REHABILITATION | Age: 63
End: 2018-09-11
Attending: FAMILY MEDICINE
Payer: COMMERCIAL

## 2018-09-11 DIAGNOSIS — M17.11 PRIMARY OSTEOARTHRITIS OF RIGHT KNEE: ICD-10-CM

## 2018-09-11 PROCEDURE — 97110 THERAPEUTIC EXERCISES: CPT

## 2018-09-11 NOTE — OP THERAPY DAILY TREATMENT
Outpatient Physical Therapy  DAILY TREATMENT     [facility] [place]  [address]  [facility] [address]  Phone:  [phone]  Fax:  [phone]    Date: 09/11/2018    Patient: Dorota Warren  YOB: 1955  MRN: [id number]     Time Calculation  Start time: 1345  Stop time: 1415 Time Calculation (min): 30 minutes     Chief Complaint: R knee problem    Visit #: 2    SUBJECTIVE:  Pt notes that one day last week she had a feeling like her knee was giving out whenever she would stad on it. Reports doing exercies daily.      OBJECTIVE:  Current objective measures: PROM -5-100          Therapeutic Exercises (CPT 44350):     1. SAQ, 40    2. Ball rolls, x3min     3. Shuttle , 3c DL partial ROM x 4min    4. Standing calf rocker , x45    5. Heel slides, x20    6. LAQ, x20    7. Quad sets, x 15, 5 sec holds    8. Extension propping, 1min      Time-based treatments/modalities:  Therapeutic exercise minutes (CPT 13066): 30 minutes       Pain rating before treatment: 3  Pain rating after treatment: 3    ASSESSMENT:   Response to treatment: Pt improving slowly with large improvement in ROM since last visit. Fear avoidance along with significant deconditioning is a a large limiting factor.       PLAN/RECOMMENDATIONS:   Plan for treatment: therapy treatment to continue next visit.  Planned interventions for next visit: continue with current treatment.

## 2018-09-13 ENCOUNTER — OFFICE VISIT (OUTPATIENT)
Dept: MEDICAL GROUP | Facility: CLINIC | Age: 63
End: 2018-09-13
Payer: COMMERCIAL

## 2018-09-13 VITALS
HEART RATE: 80 BPM | OXYGEN SATURATION: 96 % | SYSTOLIC BLOOD PRESSURE: 130 MMHG | DIASTOLIC BLOOD PRESSURE: 88 MMHG | RESPIRATION RATE: 18 BRPM | HEIGHT: 64 IN | WEIGHT: 262.75 LBS | TEMPERATURE: 97.8 F | BODY MASS INDEX: 44.86 KG/M2

## 2018-09-13 DIAGNOSIS — M17.11 PRIMARY OSTEOARTHRITIS OF RIGHT KNEE: ICD-10-CM

## 2018-09-13 PROCEDURE — 99213 OFFICE O/P EST LOW 20 MIN: CPT | Performed by: FAMILY MEDICINE
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
## 2018-09-14 ENCOUNTER — PHYSICAL THERAPY (OUTPATIENT)
Dept: PHYSICAL THERAPY | Facility: REHABILITATION | Age: 63
End: 2018-09-14
Attending: FAMILY MEDICINE
Payer: COMMERCIAL

## 2018-09-14 DIAGNOSIS — M17.11 PRIMARY OSTEOARTHRITIS OF RIGHT KNEE: ICD-10-CM

## 2018-09-14 PROCEDURE — 97110 THERAPEUTIC EXERCISES: CPT

## 2018-09-14 NOTE — OP THERAPY DAILY TREATMENT
Outpatient Physical Therapy  DAILY TREATMENT     [facility] [place]  [address], [address]  [place] [address]  Phone:  [phone]  Fax:  [phone]    Date: 09/14/2018    Patient: Dorota Warren  YOB: 1955  MRN: [id number]     Time Calculation  Start time: 0215  Stop time: 0245 Time Calculation (min): 30 minutes     Chief Complaint: R knee problem    Visit #: 3    SUBJECTIVE:  Pt saw Md yesterday with suggestion for possible joint injections if approved.  Reports doing exercies daily. Mentions her knee is catching a lot when she moves it.       OBJECTIVE:  Current objective measures: PROM -3-105          Therapeutic Exercises (CPT 24743):     1. SAQ, 40 with 2# weight    2. Heel slides, x3min     3. Supine ball rolls, x3min    4. Standing calf rocker , x45    6. LAQ, x20    7. Quad sets, x 15, 5 sec holds    8. Extension propping, 1min      Time-based treatments/modalities:  Therapeutic exercise minutes (CPT 59305): 30 minutes       Pain rating before treatment: 3  Pain rating after treatment: 3    ASSESSMENT:   Response to treatment: likely large cartilage defect, significant arthritic change in knee. Pt improving slowly with mobility and ROM.  Fear avoidance along with significant deconditioning is a a large limiting factor.       PLAN/RECOMMENDATIONS:   Plan for treatment: therapy treatment to continue next visit.  Planned interventions for next visit: continue with current treatment.

## 2018-09-18 ENCOUNTER — APPOINTMENT (OUTPATIENT)
Dept: PHYSICAL THERAPY | Facility: REHABILITATION | Age: 63
End: 2018-09-18
Attending: FAMILY MEDICINE
Payer: COMMERCIAL

## 2018-09-21 ENCOUNTER — PHYSICAL THERAPY (OUTPATIENT)
Dept: PHYSICAL THERAPY | Facility: REHABILITATION | Age: 63
End: 2018-09-21
Attending: FAMILY MEDICINE
Payer: COMMERCIAL

## 2018-09-21 DIAGNOSIS — M17.11 PRIMARY OSTEOARTHRITIS OF RIGHT KNEE: ICD-10-CM

## 2018-09-21 PROCEDURE — 97110 THERAPEUTIC EXERCISES: CPT

## 2018-09-21 PROCEDURE — 97014 ELECTRIC STIMULATION THERAPY: CPT

## 2018-09-21 NOTE — OP THERAPY DAILY TREATMENT
Outpatient Physical Therapy  DAILY TREATMENT     [facility] [place]  [address], [address]  [place] [address]  Phone:  [phone]  Fax:  [phone]    Date: 09/21/2018    Patient: Dorota Warren  YOB: 1955  MRN: [id number]     Time Calculation  Start time: 1045  Stop time: 1115 Time Calculation (min): 30 minutes     Chief Complaint: R knee problem    Visit #: 4    SUBJECTIVE:  Pt states that she has been hurting more since last visit. It ruined her weekend with her family that was visiting. Reports that she tries to do the exercises but is having a hard time.     OBJECTIVE:  Current objective measures: PROM 10-90: lacking end range motion with increased pain at end ranges.           Therapeutic Exercises (CPT 43260):     1. SAQ, x10 unable    2. Heel slides, x 5 unable    3. Supine ball rolls, unable    4. Quad sets, x 15, 5 sec holds    5. Extension propping, 1min    6. LAQ, x10 unable to complete full range    Therapeutic Treatments and Modalities:     1. E Stim Unattended (CPT 54400), IFC and MHP x 15 min R knee in supine    Time-based treatments/modalities:  Therapeutic exercise minutes (CPT 68147): 15 minutes       Pain rating before treatment: 8-9  Pain rating after treatment: 8    ASSESSMENT:   Response to treatment: pt unable to fully participate in therapy at this time due to large amounts of pain. Overall worsening symptoms and decreased ROM since last visit. Therapy will be on hold until pain is controlled and pt can fully participate with gentle exercises. Likely large cartilage defect, significant arthritic change in knee.       PLAN/RECOMMENDATIONS:   Plan for treatment: therapy treatment to continue next visit.  Planned interventions for next visit: continue with current treatment.

## 2018-09-24 ENCOUNTER — APPOINTMENT (OUTPATIENT)
Dept: PHYSICAL THERAPY | Facility: REHABILITATION | Age: 63
End: 2018-09-24
Attending: FAMILY MEDICINE
Payer: COMMERCIAL

## 2018-09-26 ENCOUNTER — APPOINTMENT (OUTPATIENT)
Dept: PHYSICAL THERAPY | Facility: REHABILITATION | Age: 63
End: 2018-09-26
Attending: FAMILY MEDICINE
Payer: COMMERCIAL

## 2018-09-27 ENCOUNTER — OFFICE VISIT (OUTPATIENT)
Dept: MEDICAL GROUP | Facility: CLINIC | Age: 63
End: 2018-09-27
Payer: COMMERCIAL

## 2018-09-27 VITALS
RESPIRATION RATE: 18 BRPM | WEIGHT: 262.06 LBS | HEART RATE: 84 BPM | DIASTOLIC BLOOD PRESSURE: 90 MMHG | OXYGEN SATURATION: 95 % | HEIGHT: 64 IN | TEMPERATURE: 98.5 F | SYSTOLIC BLOOD PRESSURE: 132 MMHG | BODY MASS INDEX: 44.74 KG/M2

## 2018-09-27 DIAGNOSIS — M17.11 PRIMARY OSTEOARTHRITIS OF RIGHT KNEE: ICD-10-CM

## 2018-09-27 RX ORDER — HYALURONATE SODIUM 10 MG/ML
1 SYRINGE (ML) INTRAARTICULAR ONCE
Status: COMPLETED | OUTPATIENT
Start: 2018-09-27 | End: 2018-09-27

## 2018-09-27 RX ADMIN — Medication 1 EACH: at 16:10

## 2018-09-27 NOTE — PROCEDURES
1. Primary osteoarthritis of right knee  Sodium Hyaluronate SOSY 1 Each     PROCEDURE NOTE:  right Knee Hyalgan injection under ultrasound guidance  Risks and benefits discussed  Informed consent obtained  Knee prepped in sterile fashion utilizing a superior-lateral approach  2.5 cc of Hyalgan injected into the knee joint space with 22 G needle with direct needle visualization  Vapocoolant spray was utilized  Patient tolerated the procedure well  Postprocedure care and reflex discussed    Images recorded with SONOSITE X-PORTE

## 2018-10-02 ENCOUNTER — APPOINTMENT (OUTPATIENT)
Dept: PHYSICAL THERAPY | Facility: REHABILITATION | Age: 63
End: 2018-10-02
Attending: FAMILY MEDICINE
Payer: COMMERCIAL

## 2018-10-04 ENCOUNTER — APPOINTMENT (OUTPATIENT)
Dept: PHYSICAL THERAPY | Facility: REHABILITATION | Age: 63
End: 2018-10-04
Attending: FAMILY MEDICINE
Payer: COMMERCIAL

## 2018-10-04 ENCOUNTER — OFFICE VISIT (OUTPATIENT)
Dept: MEDICAL GROUP | Facility: CLINIC | Age: 63
End: 2018-10-04
Payer: COMMERCIAL

## 2018-10-04 VITALS
RESPIRATION RATE: 18 BRPM | BODY MASS INDEX: 44.74 KG/M2 | WEIGHT: 262.06 LBS | TEMPERATURE: 98.6 F | HEIGHT: 64 IN | HEART RATE: 55 BPM | OXYGEN SATURATION: 93 %

## 2018-10-04 DIAGNOSIS — M17.11 PRIMARY OSTEOARTHRITIS OF RIGHT KNEE: ICD-10-CM

## 2018-10-04 PROCEDURE — 20610 DRAIN/INJ JOINT/BURSA W/O US: CPT | Performed by: FAMILY MEDICINE

## 2018-10-04 RX ORDER — HYALURONATE SODIUM 10 MG/ML
1 SYRINGE (ML) INTRAARTICULAR ONCE
Status: COMPLETED | OUTPATIENT
Start: 2018-10-04 | End: 2018-10-04

## 2018-10-04 RX ADMIN — Medication 1 EACH: at 12:49

## 2018-10-04 NOTE — PROCEDURES
1. Primary osteoarthritis of right knee  Sodium Hyaluronate SOSY 1 Each      PROCEDURE NOTE:  right Knee Hyalgan injection   Risks and benefits discussed  Informed consent obtained  Knee prepped in sterile fashion utilizing a infra-lateral approach  2.5 cc of Hyalgan injected into the knee joint space with 22 G needle  Vapocoolant spray was utilized  Patient tolerated the procedure well  Postprocedure care and reflex discussed    Follow-up in 1 week for Hyalgan injection #3

## 2018-10-09 ENCOUNTER — APPOINTMENT (OUTPATIENT)
Dept: PHYSICAL THERAPY | Facility: REHABILITATION | Age: 63
End: 2018-10-09
Attending: FAMILY MEDICINE
Payer: COMMERCIAL

## 2018-10-11 ENCOUNTER — APPOINTMENT (OUTPATIENT)
Dept: PHYSICAL THERAPY | Facility: REHABILITATION | Age: 63
End: 2018-10-11
Attending: FAMILY MEDICINE
Payer: COMMERCIAL

## 2018-10-11 ENCOUNTER — OFFICE VISIT (OUTPATIENT)
Dept: MEDICAL GROUP | Facility: CLINIC | Age: 63
End: 2018-10-11
Payer: COMMERCIAL

## 2018-10-11 VITALS
BODY MASS INDEX: 44.74 KG/M2 | OXYGEN SATURATION: 94 % | SYSTOLIC BLOOD PRESSURE: 124 MMHG | HEIGHT: 64 IN | TEMPERATURE: 98.1 F | HEART RATE: 86 BPM | DIASTOLIC BLOOD PRESSURE: 82 MMHG | RESPIRATION RATE: 18 BRPM | WEIGHT: 262.06 LBS

## 2018-10-11 DIAGNOSIS — M17.11 PRIMARY OSTEOARTHRITIS OF RIGHT KNEE: ICD-10-CM

## 2018-10-11 PROCEDURE — 20610 DRAIN/INJ JOINT/BURSA W/O US: CPT | Mod: RT | Performed by: FAMILY MEDICINE

## 2018-10-11 RX ORDER — HYALURONATE SODIUM 10 MG/ML
1 SYRINGE (ML) INTRAARTICULAR ONCE
Status: COMPLETED | OUTPATIENT
Start: 2018-10-11 | End: 2018-10-11

## 2018-10-11 RX ADMIN — Medication 1 EACH: at 13:54

## 2018-10-11 NOTE — PROCEDURES
1. Primary osteoarthritis of right knee          PROCEDURE NOTE:  right Knee Hyalgan injection under ultrasound guidance  Risks and benefits discussed  Informed consent obtained  Knee prepped in sterile fashion utilizing a superior-lateral approach  2.5 cc of Hyalgan injected into the knee joint space with 22 G needle with direct needle visualization  Vapocoolant spray was utilized  Patient tolerated the procedure well  Postprocedure care and reflex discussed    Images recorded with SONOSITE X-PORTE

## 2018-10-11 NOTE — PROGRESS NOTES
1. Primary osteoarthritis of right knee  Sodium Hyaluronate SOSY 1 Each     Procedure only  RIGHT ultrasound-guided Hyalgan injection #3 of 3    For post Hyalgan series visit

## 2018-10-16 ENCOUNTER — APPOINTMENT (OUTPATIENT)
Dept: PHYSICAL THERAPY | Facility: REHABILITATION | Age: 63
End: 2018-10-16
Attending: FAMILY MEDICINE
Payer: COMMERCIAL

## 2018-11-02 ENCOUNTER — OFFICE VISIT (OUTPATIENT)
Dept: MEDICAL GROUP | Facility: CLINIC | Age: 63
End: 2018-11-02
Payer: COMMERCIAL

## 2018-11-02 VITALS
SYSTOLIC BLOOD PRESSURE: 122 MMHG | WEIGHT: 262.06 LBS | HEART RATE: 82 BPM | TEMPERATURE: 97.6 F | DIASTOLIC BLOOD PRESSURE: 78 MMHG | HEIGHT: 64 IN | BODY MASS INDEX: 44.74 KG/M2 | RESPIRATION RATE: 18 BRPM | OXYGEN SATURATION: 96 %

## 2018-11-02 DIAGNOSIS — M17.11 PRIMARY OSTEOARTHRITIS OF RIGHT KNEE: ICD-10-CM

## 2018-11-02 PROCEDURE — 99213 OFFICE O/P EST LOW 20 MIN: CPT | Performed by: FAMILY MEDICINE
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
## 2018-11-12 ENCOUNTER — TELEPHONE (OUTPATIENT)
Dept: PHYSICAL THERAPY | Facility: REHABILITATION | Age: 63
End: 2018-11-12

## 2018-11-12 NOTE — OP THERAPY DISCHARGE SUMMARY
Outpatient Physical Therapy  DISCHARGE SUMMARY NOTE      [facility] [place]  [address], [address]  [place] [address]  Phone:  [phone]  Fax:  [phone]    Date of Visit: 11/12/2018    Patient: Dorota Warren  YOB: 1955  MRN: [id number]     Referring Provider: Jose Cox M.D.   Referring Diagnosis Primary osteoarthritis of right knee [M17.11]     Physical Therapy Occurrence Codes    Date of onset of impairment:  7/3/18   Date physical therapy care plan established or reviewed:  9/7/18   Date physical therapy treatment started:  9/7/18          Your patient is being discharged from Physical Therapy with the following comments:   · Goals not met  · Patient has failed to schedule or reschedule follow-up visits    Comments:  Therapy was put on hold on 9/21/18 due to inability to participate in therapy. Pt failed to return to physical therapy following physician intervention. Dorota Warren has been discharged due to a lapse in care greater than 30 days. Thank you for the opportunity to assist you and your patient.    Limitations Remaining:  ROM, pain    Recommendations:  F/u with physician    Thom Garcia, PT, DPT    Date: 11/12/2018

## 2018-11-21 DIAGNOSIS — R09.81 CHRONIC NASAL CONGESTION: ICD-10-CM

## 2018-11-26 RX ORDER — MONTELUKAST SODIUM 10 MG/1
10 TABLET ORAL DAILY
Qty: 90 TAB | Refills: 2 | Status: SHIPPED | OUTPATIENT
Start: 2018-11-26 | End: 2018-11-27 | Stop reason: SDUPTHER

## 2018-11-27 DIAGNOSIS — R09.81 CHRONIC NASAL CONGESTION: ICD-10-CM

## 2018-11-27 NOTE — TELEPHONE ENCOUNTER
Was the patient seen in the last year in this department? Yes    Does patient have an active prescription for medications requested? Yes    Received Request Via: Patient     Patient requests change in pharmacy due to family emergency. Will be out of town for a while.     [facility] #[id number] - [place], [place] - [address]  [address] [place] [address]  Phone: [phone] Fax: [phone]

## 2018-11-28 RX ORDER — MONTELUKAST SODIUM 10 MG/1
10 TABLET ORAL DAILY
Qty: 90 TAB | Refills: 2 | Status: SHIPPED | OUTPATIENT
Start: 2018-11-28

## 2020-07-08 ENCOUNTER — APPOINTMENT (RX ONLY)
Dept: URBAN - NONMETROPOLITAN AREA CLINIC 12 | Facility: CLINIC | Age: 65
Setting detail: DERMATOLOGY
End: 2020-07-08

## 2020-07-08 VITALS — TEMPERATURE: 98.9 F

## 2020-07-08 DIAGNOSIS — D22 MELANOCYTIC NEVI: ICD-10-CM

## 2020-07-08 DIAGNOSIS — Z71.89 OTHER SPECIFIED COUNSELING: ICD-10-CM

## 2020-07-08 DIAGNOSIS — L57.0 ACTINIC KERATOSIS: ICD-10-CM

## 2020-07-08 DIAGNOSIS — L30.9 DERMATITIS, UNSPECIFIED: ICD-10-CM

## 2020-07-08 DIAGNOSIS — L85.3 XEROSIS CUTIS: ICD-10-CM

## 2020-07-08 DIAGNOSIS — L82.1 OTHER SEBORRHEIC KERATOSIS: ICD-10-CM

## 2020-07-08 DIAGNOSIS — Z85.828 PERSONAL HISTORY OF OTHER MALIGNANT NEOPLASM OF SKIN: ICD-10-CM

## 2020-07-08 DIAGNOSIS — D18.0 HEMANGIOMA: ICD-10-CM

## 2020-07-08 DIAGNOSIS — L82.0 INFLAMED SEBORRHEIC KERATOSIS: ICD-10-CM

## 2020-07-08 DIAGNOSIS — L81.4 OTHER MELANIN HYPERPIGMENTATION: ICD-10-CM

## 2020-07-08 DIAGNOSIS — L73.8 OTHER SPECIFIED FOLLICULAR DISORDERS: ICD-10-CM

## 2020-07-08 PROBLEM — D22.5 MELANOCYTIC NEVI OF TRUNK: Status: ACTIVE | Noted: 2020-07-08

## 2020-07-08 PROBLEM — D18.01 HEMANGIOMA OF SKIN AND SUBCUTANEOUS TISSUE: Status: ACTIVE | Noted: 2020-07-08

## 2020-07-08 PROCEDURE — ? LIQUID NITROGEN

## 2020-07-08 PROCEDURE — ? COUNSELING

## 2020-07-08 PROCEDURE — 17110 DESTRUCTION B9 LES UP TO 14: CPT

## 2020-07-08 PROCEDURE — 17000 DESTRUCT PREMALG LESION: CPT | Mod: 59

## 2020-07-08 PROCEDURE — ? OTHER

## 2020-07-08 PROCEDURE — 99214 OFFICE O/P EST MOD 30 MIN: CPT | Mod: 25

## 2020-07-08 ASSESSMENT — LOCATION ZONE DERM
LOCATION ZONE: FACE
LOCATION ZONE: TRUNK
LOCATION ZONE: NECK
LOCATION ZONE: ARM

## 2020-07-08 ASSESSMENT — LOCATION SIMPLE DESCRIPTION DERM
LOCATION SIMPLE: RIGHT FOREARM
LOCATION SIMPLE: LEFT ANTERIOR NECK
LOCATION SIMPLE: LEFT FOREARM
LOCATION SIMPLE: POSTERIOR NECK
LOCATION SIMPLE: RIGHT ANTERIOR NECK
LOCATION SIMPLE: CHEST
LOCATION SIMPLE: INFERIOR FOREHEAD
LOCATION SIMPLE: RIGHT FOREHEAD

## 2020-07-08 ASSESSMENT — LOCATION DETAILED DESCRIPTION DERM
LOCATION DETAILED: UPPER STERNUM
LOCATION DETAILED: LEFT LATERAL TRAPEZIAL NECK
LOCATION DETAILED: RIGHT LATERAL TRAPEZIAL NECK
LOCATION DETAILED: INFERIOR MID FOREHEAD
LOCATION DETAILED: RIGHT SUPERIOR FOREHEAD
LOCATION DETAILED: LEFT INFERIOR LATERAL NECK
LOCATION DETAILED: LEFT PROXIMAL DORSAL FOREARM
LOCATION DETAILED: RIGHT INFERIOR LATERAL NECK
LOCATION DETAILED: RIGHT PROXIMAL DORSAL FOREARM

## 2020-07-08 NOTE — HPI: EVALUATION OF SKIN LESION(S)
What Type Of Note Output Would You Prefer (Optional)?: Bullet Format
How Severe Are Your Spot(S)?: moderate
Have Your Spot(S) Been Treated In The Past?: has not been treated
Hpi Title: Evaluation of a Skin Lesion
Additional History: Patient is here today for a full body exam due to a history of excessive sun exposure

## 2020-07-08 NOTE — PROCEDURE: OTHER
Other (Free Text): Recommend gentle cleansers and moisturizers daily like Cerave and Cetaphil
Note Text (......Xxx Chief Complaint.): This diagnosis correlates with the
Detail Level: Detailed

## 2020-07-08 NOTE — PROCEDURE: LIQUID NITROGEN
Consent: The patient's consent was obtained including but not limited to risks of crusting, scabbing, blistering, scarring, darker or lighter pigmentary change, recurrence, incomplete removal and infection.
Post-Care Instructions: I reviewed with the patient in detail post-care instructions. Patient is to wear sunprotection, and avoid picking at any of the treated lesions. Pt may apply Vaseline to crusted or scabbing areas.
Render Post-Care Instructions In Note?: yes
Detail Level: Detailed
Render Note In Bullet Format When Appropriate: No
Duration Of Freeze Thaw-Cycle (Seconds): 1
Number Of Freeze-Thaw Cycles: 2 freeze-thaw cycles
Number Of Freeze-Thaw Cycles: 3 freeze-thaw cycles
Medical Necessity Clause: This procedure was medically necessary because the lesions that were treated were:
Medical Necessity Information: It is in your best interest to select a reason for this procedure from the list below. All of these items fulfill various CMS LCD requirements except the new and changing color options.
Duration Of Freeze Thaw-Cycle (Seconds): 3

## 2021-09-14 NOTE — TELEPHONE ENCOUNTER
Patient is already using the mometasone (ASMANEX) 220 MCG/INH inhaler. She wants to know if you still want her to come in. Can you please advise. Thank you       Normal for race